# Patient Record
Sex: MALE | Race: WHITE | NOT HISPANIC OR LATINO | Employment: FULL TIME | URBAN - NONMETROPOLITAN AREA
[De-identification: names, ages, dates, MRNs, and addresses within clinical notes are randomized per-mention and may not be internally consistent; named-entity substitution may affect disease eponyms.]

---

## 2023-01-06 ENCOUNTER — OFFICE VISIT (OUTPATIENT)
Dept: URGENT CARE | Facility: CLINIC | Age: 43
End: 2023-01-06

## 2023-01-06 VITALS
DIASTOLIC BLOOD PRESSURE: 104 MMHG | OXYGEN SATURATION: 99 % | SYSTOLIC BLOOD PRESSURE: 170 MMHG | WEIGHT: 252 LBS | HEIGHT: 72 IN | RESPIRATION RATE: 18 BRPM | HEART RATE: 98 BPM | BODY MASS INDEX: 34.13 KG/M2 | TEMPERATURE: 97.3 F

## 2023-01-06 DIAGNOSIS — M54.42 ACUTE LEFT-SIDED LOW BACK PAIN WITH LEFT-SIDED SCIATICA: Primary | ICD-10-CM

## 2023-01-06 RX ORDER — CYCLOBENZAPRINE HCL 5 MG
5 TABLET ORAL 3 TIMES DAILY PRN
Qty: 6 TABLET | Refills: 0 | Status: SHIPPED | OUTPATIENT
Start: 2023-01-06

## 2023-01-06 RX ORDER — PREDNISONE 50 MG/1
50 TABLET ORAL DAILY
Qty: 5 TABLET | Refills: 0 | Status: SHIPPED | OUTPATIENT
Start: 2023-01-06 | End: 2023-01-11

## 2023-01-06 RX ORDER — IBUPROFEN 200 MG
400 TABLET ORAL EVERY 6 HOURS PRN
COMMUNITY

## 2023-01-06 NOTE — PATIENT INSTRUCTIONS
Take steroids as prescribed   Use Flexeril as needed but do not drink alcohol, drive your vehicle, or operate heavy machinery while using this medication  OTC Tylenol for pain  Heat/Ice  OTC Lidoderm patches  Gentle back stretches  Referral placed to Spine Program   Follow up with PCP in 3-5 days  Proceed to  ER if symptoms worsen  Back Pain   AMBULATORY CARE:   Back pain  is common  You may have back pain and muscle spasms  You may feel sore or stiff on one or both sides of your back  The pain may spread to your lower body  Conditions that affect the spine, joints, or muscles can cause back pain  These may include arthritis, spinal stenosis (narrowing of the spinal column), muscle tension, or breakdown of the spinal discs  Call your local emergency number (911 in the [address] [facility]) if:   You have severe back pain with chest pain  You cannot control your urine or bowel movements  Your pain becomes so severe that you cannot walk  Seek care immediately if:   You have pain, numbness, or weakness in one or both legs  You have severe back pain, nausea, and vomiting  You have severe back pain that spreads to your side or genital area  Call your doctor if:   You have back pain that does not get better with rest and pain medicine  You have a fever  You have pain that worsens when you are on your back or when you rest     You have pain that worsens when you cough or sneeze  You lose weight without trying  You have questions or concerns about your condition or care  Medicines: You may need any of the following:  NSAIDs , such as ibuprofen, help decrease swelling, pain, and fever  This medicine is available with or without a doctor's order  NSAIDs can cause stomach bleeding or kidney problems in certain people  If you take blood thinner medicine, always ask your healthcare provider if NSAIDs are safe for you  Always read the medicine label and follow directions      Acetaminophen  decreases pain and fever  It is available without a doctor's order  Ask how much to take and how often to take it  Follow directions  Read the labels of all other medicines you are using to see if they also contain acetaminophen, or ask your doctor or pharmacist  Acetaminophen can cause liver damage if not taken correctly  Do not use more than 4 grams (4,000 milligrams) total of acetaminophen in one day  Muscle relaxers  help decrease muscle spasms and back pain  Take your medicine as directed  Contact your healthcare provider if you think your medicine is not helping or if you have side effects  Tell him or her if you are allergic to any medicine  Keep a list of the medicines, vitamins, and herbs you take  Include the amounts, and when and why you take them  Bring the list or the pill bottles to follow-up visits  Carry your medicine list with you in case of an emergency  Manage your back pain:   Apply ice  on your back for 15 to 20 minutes every hour or as directed  Use an ice pack, or put crushed ice in a plastic bag  Cover it with a towel before you apply it to your skin  Ice helps prevent tissue damage and decreases pain  Apply heat  on your back for 20 to 30 minutes every 2 hours for as many days as directed  Heat helps decrease pain and muscle spasms  Stay active  as much as you can without causing more pain  Bed rest could make your back pain worse  Avoid heavy lifting until your pain is gone  Go to physical therapy as directed  A physical therapist can teach you exercises to help improve movement and strength, and to decrease pain  Follow up with your doctor in 2 weeks, or as directed: You might need to see a specialist  Write down your questions so you remember to ask them during your visits  © Copyright [employer] 2022 Information is for End User's use only and may not be sold, redistributed or otherwise used for commercial purposes   All illustrations and images included in CareNotes® are the copyrighted property of A D A M , Inc  or [facility] Lucina Mazariegos   The above information is an  only  It is not intended as medical advice for individual conditions or treatments  Talk to your doctor, nurse or pharmacist before following any medical regimen to see if it is safe and effective for you

## 2023-01-06 NOTE — PROGRESS NOTES
[facility]        NAME: Emperatriz Suero is a 36 y o  male  : 1980    MRN: [id number]  DATE: 2023  TIME: 11:50 AM    Assessment and Plan   Acute left-sided low back pain with left-sided sciatica [M54 42]  1  Acute left-sided low back pain with left-sided sciatica  predniSONE 50 mg tablet    cyclobenzaprine (FLEXERIL) 5 mg tablet    Ambulatory Referral to [facility]        No red flag finding's on today's PE, neuro intact  Last dose of NSAIDS at 1030 this am and cannot administer Toradol IM in clinic  Patient prescribed flexeril, prednisone, and encouraged to continue with supportive measures  Caution advised with muscle relaxer  Referral placed to [facility] Program  Follow up with PCP in 3-5 days or proceed to ED if symptoms worsen  Patient verbalized understanding of instructions given  Patient Instructions     Patient Instructions   Take steroids as prescribed   Use Flexeril as needed but do not drink alcohol, drive your vehicle, or operate heavy machinery while using this medication  OTC Tylenol for pain  Heat/Ice  OTC Lidoderm patches  Gentle back stretches  Referral placed to Spine Program   Follow up with PCP in 3-5 days  Proceed to  ER if symptoms worsen  Back Pain   AMBULATORY CARE:   Back pain  is common  You may have back pain and muscle spasms  You may feel sore or stiff on one or both sides of your back  The pain may spread to your lower body  Conditions that affect the spine, joints, or muscles can cause back pain  These may include arthritis, spinal stenosis (narrowing of the spinal column), muscle tension, or breakdown of the spinal discs  Call your local emergency number (911 in the [address] [place] [address],[address]) if:   · You have severe back pain with chest pain  · You cannot control your urine or bowel movements  · Your pain becomes so severe that you cannot walk      Seek care immediately if:   · You have pain, numbness, or weakness in one or both legs     · You have severe back pain, nausea, and vomiting  · You have severe back pain that spreads to your side or genital area  Call your doctor if:   · You have back pain that does not get better with rest and pain medicine  · You have a fever  · You have pain that worsens when you are on your back or when you rest     · You have pain that worsens when you cough or sneeze  · You lose weight without trying  · You have questions or concerns about your condition or care  Medicines: You may need any of the following:  · NSAIDs , such as ibuprofen, help decrease swelling, pain, and fever  This medicine is available with or without a doctor's order  NSAIDs can cause stomach bleeding or kidney problems in certain people  If you take blood thinner medicine, always ask your healthcare provider if NSAIDs are safe for you  Always read the medicine label and follow directions  · Acetaminophen  decreases pain and fever  It is available without a doctor's order  Ask how much to take and how often to take it  Follow directions  Read the labels of all other medicines you are using to see if they also contain acetaminophen, or ask your doctor or pharmacist  Acetaminophen can cause liver damage if not taken correctly  Do not use more than 4 grams (4,000 milligrams) total of acetaminophen in one day  · Muscle relaxers  help decrease muscle spasms and back pain  · Take your medicine as directed  Contact your healthcare provider if you think your medicine is not helping or if you have side effects  Tell him or her if you are allergic to any medicine  Keep a list of the medicines, vitamins, and herbs you take  Include the amounts, and when and why you take them  Bring the list or the pill bottles to follow-up visits  Carry your medicine list with you in case of an emergency  Manage your back pain:   · Apply ice  on your back for 15 to 20 minutes every hour or as directed   Use an ice pack, or put crushed ice in a plastic bag  Cover it with a towel before you apply it to your skin  Ice helps prevent tissue damage and decreases pain  · Apply heat  on your back for 20 to 30 minutes every 2 hours for as many days as directed  Heat helps decrease pain and muscle spasms  · Stay active  as much as you can without causing more pain  Bed rest could make your back pain worse  Avoid heavy lifting until your pain is gone  · Go to physical therapy as directed  A physical therapist can teach you exercises to help improve movement and strength, and to decrease pain  Follow up with your doctor in 2 weeks, or as directed: You might need to see a specialist  Write down your questions so you remember to ask them during your visits  © Copyright [employer] 2022 Information is for End User's use only and may not be sold, redistributed or otherwise used for commercial purposes  All illustrations and images included in CareNotes® are the copyrighted property of A D A M , Inc  or [facility] [employer]   The above information is an  only  It is not intended as medical advice for individual conditions or treatments  Talk to your doctor, nurse or pharmacist before following any medical regimen to see if it is safe and effective for you  Chief Complaint     Chief Complaint   Patient presents with   • Leg Pain     C/o pain in left hip which radiates down left leg  Denies trauma, reports does repetitive lifting and also weight lifting  Onset 12 days ago  History of Present Illness       44-year-old male presents with complaints of left-sided lower back pain that radiates down left lower extremity and is accompanied by numbness x12 days  Patient denies any known injury or trauma but does admit to weight lifting  He has been taking OTC Advil without improvement of his symptoms, last dose around 1030 this morning    Patient states a history of chronic low back pain and sciatica, concern for the same  Patient denies any loss of bowel or bladder, weakness, or fever/chills  Leg Pain   Associated symptoms include numbness  Review of Systems   Review of Systems   Constitutional: Negative for chills and fever  Respiratory: Negative for shortness of breath  Cardiovascular: Negative for chest pain  Gastrointestinal: Negative for abdominal pain, diarrhea, nausea and vomiting  Genitourinary: Negative for difficulty urinating  Musculoskeletal: Positive for back pain  Negative for neck pain  Skin: Negative for rash  Neurological: Positive for numbness  Negative for weakness  Current Medications       Current Outpatient Medications:   •  cyclobenzaprine (FLEXERIL) 5 mg tablet, Take 1 tablet (5 mg total) by mouth 3 (three) times a day as needed for muscle spasms, Disp: 6 tablet, Rfl: 0  •  ibuprofen (MOTRIN) 200 mg tablet, Take 400 mg by mouth every 6 (six) hours as needed for mild pain, Disp: , Rfl:   •  predniSONE 50 mg tablet, Take 1 tablet (50 mg total) by mouth daily for 5 days, Disp: 5 tablet, Rfl: 0    Current Allergies     Allergies as of 01/06/2023   • (No Known Allergies)            The following portions of the patient's history were reviewed and updated as appropriate: allergies, current medications, past family history, past medical history, past social history, past surgical history and problem list      History reviewed  No pertinent past medical history  Past Surgical History:   Procedure Laterality Date   • FRACTURE SURGERY         Family History   Problem Relation Age of Onset   • No Known Problems Mother    • Hypertension Father          Medications have been verified  Objective   BP (!) 170/104   Pulse 98   Temp (!) 97 3 °F (36 3 °C)   Resp 18   Ht 6' (1 829 m)   Wt 114 kg (252 lb)   SpO2 99%   BMI 34 18 kg/m²   No LMP for male patient  Physical Exam     Physical Exam  Vitals and nursing note reviewed     Constitutional:       General: He is not in acute distress  Appearance: He is not toxic-appearing  HENT:      Head: Normocephalic  Nose: Nose normal       Mouth/Throat:      Mouth: Mucous membranes are moist       Pharynx: Oropharynx is clear  Eyes:      Conjunctiva/sclera: Conjunctivae normal    Cardiovascular:      Rate and Rhythm: Normal rate and regular rhythm  Heart sounds: Normal heart sounds  Pulmonary:      Effort: Pulmonary effort is normal  No respiratory distress  Breath sounds: Normal breath sounds  No stridor  No wheezing, rhonchi or rales  Abdominal:      General: Bowel sounds are normal       Palpations: Abdomen is soft  Tenderness: There is no abdominal tenderness  Musculoskeletal:         General: Tenderness present  Cervical back: No tenderness  Thoracic back: Normal       Lumbar back: Tenderness and bony tenderness present  Normal range of motion  Positive left straight leg raise test  Negative right straight leg raise test       Comments: Tenderness to palpation over lumbar spinous processes and left sided lumbar paraspinal muscles as well as SI joint tenderness  Skin:     General: Skin is warm and dry  Neurological:      Mental Status: He is alert and oriented to person, place, and time  Sensory: Sensation is intact  No sensory deficit  Motor: Motor function is intact  No weakness  Gait: Gait is intact   Gait normal    Psychiatric:         Mood and Affect: Mood normal          Behavior: Behavior normal

## 2023-01-09 ENCOUNTER — NURSE TRIAGE (OUTPATIENT)
Dept: PHYSICAL THERAPY | Facility: OTHER | Age: 43
End: 2023-01-09

## 2023-01-09 NOTE — TELEPHONE ENCOUNTER
Additional Information  • Negative: Is this related to a work injury? • Negative: Is this related to an MVA? • Negative: Are you currently recieving homecare services? Background - Initial Assessment  Clinical complaint: pain is left low back, left hip and left leg to the foot  Certain spots on left leg and foot are numb  Was seen in [facility] 1/6/2023   NKI Pain started around 12/25/22  Date of onset:   Frequency of pain: intermittent  Quality of pain: burning and numb    Protocols used: [facility] COMPREHENSIVE SPINE PROGRAM PROTOCOL

## 2023-01-09 NOTE — TELEPHONE ENCOUNTER
This RN reached out to patient for completion of triage process  Please see prior notes  Nurse did not receive a CB from the patient as of yet  Intentions were to proceed with his triage for referral to be entered by end of day  Patient did not answer call  Unable to LM as his voice MB is FULL  Will await CB as planned to proceed  Referral closed per protocol

## 2023-01-09 NOTE — TELEPHONE ENCOUNTER
Additional Information  • Negative: Is the patient experiencing blood in sputum? • Negative: Has the patient had unexplained weight loss? • Negative: Does the patient have a fever? • Negative: Is the patient experiencing urine retention? • Negative: Is the patient experiencing acute drop foot or paralysis? • Negative: Has the patient experienced major trauma? (fall from height, high speed collision, direct blow to spine) and is also experiencing nausea, light-headedness, or loss of consciousness? • Negative: Is this a chronic condition? Protocols used: [facility] COMPREHENSIVE SPINE PROGRAM PROTOCOL  Nurse briefly reviewed completion of triage process and entry of referral for PT eval with [facility]  Nurse proceeded with questions  Patient asked nurse if she could call him back "in like 20 minutes"  Patient had mentioned he would like CB in afternoon when speaking with another staff member  RN requested patient call [facility] back when he is able to complete the remaining "Yes or NO type questions"  Patient agreed and thanked nurse  Will await CB to continue with this encounter

## 2023-01-16 DIAGNOSIS — G89.29 OTHER CHRONIC PAIN: ICD-10-CM

## 2023-01-16 DIAGNOSIS — M54.42 LUMBAGO WITH SCIATICA, LEFT SIDE: ICD-10-CM

## 2023-01-20 ENCOUNTER — HOSPITAL ENCOUNTER (OUTPATIENT)
Dept: MRI IMAGING | Facility: HOSPITAL | Age: 43
End: 2023-01-20

## 2023-01-20 DIAGNOSIS — M54.42 LUMBAGO WITH SCIATICA, LEFT SIDE: ICD-10-CM

## 2023-01-20 DIAGNOSIS — G89.29 OTHER CHRONIC PAIN: ICD-10-CM

## 2023-01-27 ENCOUNTER — TELEPHONE (OUTPATIENT)
Dept: PHYSICAL THERAPY | Facility: OTHER | Age: 43
End: 2023-01-27

## 2023-01-27 NOTE — TELEPHONE ENCOUNTER
Message posted on TEAMS for Nursing staff that the patient left a VM on  [facility] MB  Referral to [facility] was entered by provider at [facility] on 1/6/23  DX attached: LBP and Sciatica  Triage started on 1/9/23  PLEASE SEE ALL NOTES FROM 1/9/23 TRIAGE ENCOUNTER  This nurse reached out to patient to discuss VM left today and/or proceed with triage questions  Nurse briefly reviewed prior encounter with him and asked if he wanted to proceed  Patient stated he was seen by [facility] Surgical specialty earlier today and referred to PM   Nurse asked if he called the [facility] office to schedule and he had  He was transferred to [facility]  Patient would like to call office back and schedule appointment as he discussed with Ortho today  Nurse looked up information on line to assist  Patient was given paper referral and S&P office number was provided at his appointment with [facility] this morning  Nurse understood and respected his desire to move forward  Encouraged him to call the office back and discuss the above with clerical staff and also mention he talked with the Nurse at [address]  Patient agreed and thanked nurse  Nurse wished him well  Referral previously closed  No

## 2023-02-03 ENCOUNTER — TELEPHONE (OUTPATIENT)
Dept: OTHER | Facility: OTHER | Age: 43
End: 2023-02-03

## 2023-02-03 NOTE — TELEPHONE ENCOUNTER
Patient is calling regarding cancelling an appointment      Date/Time: 2/3/23 at 7am    Patient was rescheduled: YES [] NO [x]    Patient requesting call back to reschedule: YES [x] NO []    Patient is requesting a later time today for his appointment

## 2023-02-14 RX ORDER — GABAPENTIN 300 MG/1
CAPSULE ORAL
COMMUNITY
Start: 2023-01-27 | End: 2023-02-17

## 2023-02-17 ENCOUNTER — CONSULT (OUTPATIENT)
Dept: PAIN MEDICINE | Facility: CLINIC | Age: 43
End: 2023-02-17

## 2023-02-17 VITALS
SYSTOLIC BLOOD PRESSURE: 154 MMHG | HEART RATE: 96 BPM | HEIGHT: 72 IN | WEIGHT: 265 LBS | RESPIRATION RATE: 20 BRPM | BODY MASS INDEX: 35.89 KG/M2 | DIASTOLIC BLOOD PRESSURE: 88 MMHG | TEMPERATURE: 98 F

## 2023-02-17 DIAGNOSIS — M54.16 LUMBAR RADICULOPATHY: Primary | ICD-10-CM

## 2023-02-17 RX ORDER — 0.9 % SODIUM CHLORIDE 0.9 %
1 VIAL (ML) INJECTION ONCE
OUTPATIENT
Start: 2023-02-17 | End: 2023-02-17

## 2023-02-17 RX ORDER — PREGABALIN 75 MG/1
75 CAPSULE ORAL 3 TIMES DAILY
Qty: 90 CAPSULE | Refills: 0 | Status: SHIPPED | OUTPATIENT
Start: 2023-02-17

## 2023-02-17 RX ORDER — LIDOCAINE HYDROCHLORIDE 10 MG/ML
10 INJECTION, SOLUTION EPIDURAL; INFILTRATION; INTRACAUDAL; PERINEURAL ONCE
OUTPATIENT
Start: 2023-02-17 | End: 2023-02-17

## 2023-02-17 NOTE — PATIENT INSTRUCTIONS
Core Strengthening Exercises   WHAT YOU NEED TO KNOW:   Your core includes the muscles of your lower back, hip, pelvis, and abdomen  Core strengthening exercises help heal and strengthen these muscles  This helps prevent another injury, and keeps your pelvis, spine, and hips in the correct position  DISCHARGE INSTRUCTIONS:   Call your doctor or physical therapist if:   You have sharp or worsening pain during exercise or at rest     You have questions or concerns about your shoulder exercises  Safety tips:  Talk to your healthcare provider before you start an exercise program  A physical therapist can teach you how to do core strengthening exercises safely  Do the exercises on a mat or firm surface  A firm surface will support your spine and prevent low back pain  Do not do these exercises on a bed  Move slowly and smoothly  Avoid fast or jerky motions  Stop if you feel pain  You may feel some discomfort at first, but you should not feel pain  Tell your provider or physical therapist if you have pain while you exercise  Regular exercise will help decrease your discomfort over time  Breathe normally during core exercises  Do not hold your breath  This may cause an increase in blood pressure and prevent muscle strengthening  Your healthcare provider will tell you when to inhale and exhale during the exercise  Begin all of your exercises with abdominal bracing  Abdominal bracing helps warm up your core muscles  You can also practice abdominal bracing throughout the day  Lie on your back with your knees bent and feet flat on the floor  Place your arms in a relaxed position beside your body  Tighten your abdominal muscles  Pull your belly button in and up toward your spine  Hold for 5 seconds  Relax your muscles  Repeat 10 times  Core strengthening exercises: Your healthcare provider will tell you how often to do these exercises   The provider will also tell you how many repetitions of each exercise you should do  Hold each exercise for 5 seconds or as directed  As you get stronger, increase your hold to 10 to 15 seconds  You can do some of these exercises on a stability ball, or with a weight  Ask your healthcare provider how to use a stability ball or weight for these exercises:  Bridging:  Lie on your back with your knees bent and feet flat on the floor  Rest your arms at your side  Tighten your buttocks, and then lift your hips 1 inch off the floor  Hold for 5 seconds  When you can do this exercise without pain for 10 seconds, increase the distance you lift your hips  A good goal is to be able to lift your hips so that your shoulders, hips, and knees are in a straight line  Dead bug:  Lie on your back with your knees bent and feet flat on the floor  Place your arms in a relaxed position beside your body  Begin with abdominal bracing  Next, raise one leg, keeping your knee bent  Hold for 5 seconds  Repeat with the other leg  When you can do this exercise without pain for 10 to 15 seconds, you may raise one straight leg and hold  Repeat with the other leg  Quadruped:  Place your hands and knees on the floor  Keep your wrists directly below your shoulders and your knees directly below your hips  Pull your belly button in toward your spine  Do not flatten or arch your back  Tighten your abdominal muscles below your belly button  Hold for 5 seconds  When you can do this exercise without pain for 10 to 15 seconds, you may extend one arm and hold  Repeat on the other side  Side bridge exercises:      Standing side bridge:  Stand next to a wall and extend one arm toward the wall  Place your palm flat on the wall with your fingers pointing upward  Begin with abdominal bracing  Next, without moving your feet, slowly bend your arm to 90 degrees  Hold for 5 seconds  Repeat on the other side   When you can do this exercise without pain for 10 to 15 seconds, you may do the bent leg side bridge on the floor  Bent leg side bridge:  Lie on one side with your legs, hips, and shoulders in a straight line  Prop yourself up onto your forearm so your elbow is directly below your shoulder  Bend your knees back to 90 degrees  Begin with abdominal bracing  Next, lift your hips and balance yourself on your forearm and knees  Hold for 5 seconds  Repeat on the other side  When you can do this exercise without pain for 10 to 15 seconds, you may do the straight leg side bridge on the floor  Straight leg side bridge:  Lie on one side with your legs, hips, and shoulders in a straight line  Prop yourself up onto your forearm so your elbow is directly below your shoulder  Begin with abdominal bracing  Lift your hips off the floor and balance yourself on your forearm and the outside of your flexed foot  Do not let your ankle bend sideways  Hold for 5 seconds  Repeat on the other side  When you can do this exercise without pain for 10 to 15 seconds, ask your healthcare provider for more advanced exercises  Superman:  Lie on your stomach  Extend your arms forward on the floor  Tighten your abdominal muscles and lift your right hand and left leg off the floor  Hold this position  Slowly return to the starting position  Tighten your abdominal muscles and lift your left hand and right leg off the floor  Hold this position  Slowly return to the starting position  Clam:  Lie on your side with your knees bent  Put your bottom arm under your head to keep your neck in line  Put your top hand on your hip to keep your pelvis from moving  Put your heels together, and keep them together during this exercise  Slowly raise your top knee toward the ceiling  Then lower your leg so your knees are together  Repeat this exercise 10 times  Then switch sides and do the exercise 10 times with the other leg  Curl up:  Lie on your back with your knees bent and feet flat on the floor   Place your hands, palms down, underneath your lower back  Next, with your elbows on the floor, lift your shoulders and chest 2 to 3 inches off the floor  Keep your head in line with your shoulders  Hold this position  Slowly return to the starting position  Straight leg raises:  Lie on your back with one leg straight  Bend the other knee and place your foot flat on the floor  Tighten your abdominal muscles  Keep your leg straight and slowly lift it straight up 6 to 12 inches off the floor  Hold this position  Lower your leg slowly  Do as many repetitions as directed on this side  Repeat with the other leg  Plank:  Lie on your stomach  Bend your elbows and place your forearms flat on the floor  Lift your chest, stomach, and knees off the floor  Make sure your elbows are below your shoulders  Your body should be in a straight line  Do not let your hips or lower back sink to the ground  Squeeze your abdominal muscles together and hold for 15 seconds  To make this exercise harder, hold for 30 seconds or lift 1 leg at a time  Bicycles:  Lie on your back  Bend both knees and bring them toward your chest  Your calves should be parallel to the floor  Place the palms of your hands on the back of your head  Straighten your right leg and keep it lifted 2 inches off the floor  Raise your head and shoulders off the floor and twist towards your left  Keep your head and shoulders lifted  Bend your right knee while you straighten your left leg  Keep your left leg 2 inches off the floor  Twist your head and chest towards the left leg  Continue to straighten 1 leg at a time and twist        Follow up with your doctor or physical therapist as directed:  Write down your questions so you remember to ask them during your visits  © Copyright [employer] 2022 Information is for End User's use only and may not be sold, redistributed or otherwise used for commercial purposes  The above information is an  only   It is not intended as medical advice for individual conditions or treatments  Talk to your doctor, nurse or pharmacist before following any medical regimen to see if it is safe and effective for you

## 2023-02-17 NOTE — LETTER
February 17, 2023       No Recipients    Patient: George Connors   YOB: 1980   Date of Visit: 2/17/2023       Dear Dr Portillo Getting Recipients: Thank you for referring George Connors to me for evaluation  Below are my notes for this consultation  If you have questions, please do not hesitate to call me  I look forward to following your patient along with you  Sincerely,        Fredy Dorman MD        CC:   No Recipients  Fredy Dorman MD  2/17/2023  9:39 AM  Signed      Assessment  1  Lumbar radiculopathy - Left  -     pregabalin (LYRICA) 75 mg capsule; Take 1 capsule (75 mg total) by mouth 3 (three) times a day  -     Case request operating room: BLOCK / INJECTION EPIDURAL STEROID LUMBAR Left L5 and S1 TFESI; Standing  -     Case request operating room: BLOCK / INJECTION EPIDURAL STEROID LUMBAR Left L5 and S1 TFESI    Left-sided lumbar radicular pain in the L4 and L5 dermatomal distribution accompanied by pain limited weakness numbness and paresthesias  Patient has initiated participation with PT  Chronic pain with decreased participation with IADLs over the past 3 months  Has been taking OTC ibuprofen and tylenol in addition to gabapentin and bsauds with modest benefit  5/5 strength bilaterally, positive SLR left-sided  Reflexes 2+  Additionally there is positive facet loading, left greater than right  Denies any gait instability, saddle anesthesia  On MRI imaging at L5-S1 there is prominent central disc extrusion extending into right subarticular and right foraminal zones with inferior migration compressing cauda equina nerve roots and resulting in severe canal stenosis  At L4-L5 there is small central disc extrusion with inferior migration contacts left L5 descending nerve root and results in moderate canal stenosis     Risks, benefits alternatives epidural steroid injections thoroughly discussed with patient  Handouts provided questions answered to patient's satisfaction    Lifestyle modifications extensively discussed including diet, exercise and weight loss in addition to core strengthening  Will proceed with multimodal pain therapy plan as noted below:    Plan  -Left L4 and L5 TFESI; f/u 2 weeks post procedure  -gabapentin transitioned to lyrica 75 mg t i d  Ordered for patient; counseled regarding sedative effects of taking this medication and provided up titration calendar  Counseled not to take medication while driving or operating heavy machinery/using stairs  -Diclofenac 75 mg b i d  prn pain previously prescribed  Patient educated regarding bleeding risk of taking this medication as well as not taking any other nonsteroidal anti-inflammatory medications while taking this medication; counseled thoroughly regarding potential risk of Cardiovascular injury, Kidney injury, Gastrointestinal ulceration/bleeding  Patient voiced understanding  -script provided for formal physical therapy for left-sided lumbar radiculopathy; Physician directed home exercise plan as per [facility] demonstrated and handouts provided that patient plans to participate with for 1 hour, twice a week for the next 6 weeks  There are risks associated with opioid medications, including dependence, addiction and tolerance  The patient understands and agrees to use these medications only as prescribed  Potential side effects of the medications include, but are not limited to, constipation, drowsiness, addiction, impaired judgment and risk of fatal overdose if not taken as prescribed  The patient was warned against driving while taking sedation medications or operating heavy machinery  The patient voiced understanding  Sharing medications is a felony  At this point in time, the patient is showing no signs of addiction, abuse, diversion or suicidal ideation       [place] Prescription Drug Monitoring Program report was reviewed and was appropriate      Complete risks and benefits including bleeding, infection, tissue reaction, nerve injury and allergic reaction were discussed  The approach was demonstrated using models and literature was provided  Verbal and written consent was obtained  My impressions and treatment recommendations were discussed in detail with the patient who verbalized understanding and had no further questions  Discharge instructions were provided  I personally saw and examined the patient and I agree with the above discussed plan of care  New Medications Ordered This Visit   Medications   • gabapentin (NEURONTIN) 300 mg capsule     Sig: TAKE 1 CAPSULE BY MOUTH EVERY DAY IN THE MORNING , AT NOON, AND BEFORE BEDTIME       History of Present Illness    Haris Huynh is a 36 y o  male with pmhx of obesity presenting with chronic lumbar radicular pain in the left L4 and L5 dermatomal distributions  Debilitating pain limited weakness numbness and paresthesias accompany the pain  The patient rates the pain at a 8/10 accompanied by electric shock-like shooting features and crampy burning pain in the aforementioned dermatomal distributions  The pain is worse in the mornings as well as the end of the day; exertion such as walking for long periods of time seems to exacerbate the pain  He tries to maintain an active lifestyle and finds that the current degree of pain seems to compromises his efforts  The pain significantly impacts independent activities of daily living and contributes to significant disability  He has initiated physical therapy  He has taken nsaids as well as gabapentin with limited relief of the pain as well  He has never tried epidural steroid injections in the past  He denies any bowel or bladder dysfunction/incontinence, saddle anesthesia or gait instability  I have personally reviewed and/or updated the patient's past medical history, past surgical history, family history, social history, current medications, allergies, and vital signs today       Review of Systems   Constitutional: Positive for activity change  HENT: Negative  Eyes: Negative  Respiratory: Negative  Cardiovascular: Negative  Gastrointestinal: Negative  Endocrine: Negative  Genitourinary: Negative  Musculoskeletal: Positive for arthralgias, back pain, gait problem and myalgias  Skin: Negative  Allergic/Immunologic: Negative  Neurological: Positive for weakness and numbness  Hematological: Negative  Psychiatric/Behavioral: Negative  All other systems reviewed and are negative  There is no problem list on file for this patient  History reviewed  No pertinent past medical history      Past Surgical History:   Procedure Laterality Date   • FRACTURE SURGERY         Family History   Problem Relation Age of Onset   • No Known Problems Mother    • Hypertension Father        Social History     Occupational History   • Not on file   Tobacco Use   • Smoking status: Never   • Smokeless tobacco: Never   Vaping Use   • Vaping Use: Never used   Substance and Sexual Activity   • Alcohol use: Yes     Comment: rare   • Drug use: Never   • Sexual activity: Not on file       Current Outpatient Medications on File Prior to Visit   Medication Sig   • diclofenac (VOLTAREN) 75 mg EC tablet Take 75 mg by mouth 2 (two) times a day   • gabapentin (NEURONTIN) 300 mg capsule TAKE 1 CAPSULE BY MOUTH EVERY DAY IN THE MORNING , AT NOON, AND BEFORE BEDTIME   • ibuprofen (MOTRIN) 200 mg tablet Take 400 mg by mouth every 6 (six) hours as needed for mild pain   • tiZANidine (ZANAFLEX) 4 mg tablet Take 1 tablet by mouth every 8 (eight) hours as needed   • cyclobenzaprine (FLEXERIL) 5 mg tablet Take 1 tablet (5 mg total) by mouth 3 (three) times a day as needed for muscle spasms (Patient not taking: Reported on 2/17/2023)   • gabapentin (NEURONTIN) 100 mg capsule TAKE 1 CAP TWICE DAILY FOR 1 WEEK THAN INCREASE TO THREE TIMES DAILY (Patient not taking: Reported on 2/17/2023)   • predniSONE 20 mg tablet TAKE 2 TABLETS BY MOUTH IN THE MORNING FOR 5 DAYS  (Patient not taking: Reported on 2/17/2023)     No current facility-administered medications on file prior to visit  No Known Allergies      Physical Exam    /88   Pulse 96   Temp 98 °F (36 7 °C)   Resp 20   Ht 6' (1 829 m)   Wt 120 kg (265 lb)   BMI 35 94 kg/m²     Constitutional: normal, well developed, well nourished, alert, in no distress and non-toxic and no overt pain behavior  and obese  Eyes: anicteric  HEENT: grossly intact  Neck: supple, symmetric, trachea midline and no masses   Pulmonary:even and unlabored  Cardiovascular:No edema or pitting edema present  Skin:Normal without rashes or lesions and well hydrated  Psychiatric:Mood and affect appropriate   Neurologic:Cranial Nerves II-XII grossly intact Sensation grossly intact; no clonus negative duran's  Reflexes 2+ and brisk  SLR positive left sided  Musculoskeletal:normal gait  5/5 strength bilaterally with AROM in all extremities  Normal heel toe and tip toe walking  Pain with lumbar facet loading bilaterally and with lateral spine rotation  ttp over lumbar paraspinal muscles  Negative connor's test, negative gaenslen's negative SIJ loading bilaterally  Imaging    MRI LUMBAR SPINE WITHOUT CONTRAST     INDICATION: M54 [id number]: Lumbago with sciatica, left side  G89 29: Other chronic pain  Low back pain; Neurologic deficit, non-traumatic; LE weakness; No increasing/persistent or sudden onset LE weakness     COMPARISON:  Outside lumbar spine radiograph January 13, 2023      TECHNIQUE:  Multiplanar, multisequence imaging of the lumbar spine was performed             IMAGE QUALITY:  Diagnostic     FINDINGS:     VERTEBRAL BODIES:  There are 5 lumbar type vertebral bodies  Normal alignment of the lumbar spine  No spondylolysis or spondylolisthesis  No scoliosis  No compression fracture          Small L5 intraosseous vertebral body hemangioma    Otherwise, normal bone marrow signal      SACRUM:  Normal signal within the sacrum  No evidence of insufficiency or stress fracture      DISTAL CORD AND CONUS:  Normal size and signal within the distal cord and conus      PARASPINAL SOFT TISSUES:  Paraspinal soft tissues are unremarkable      LOWER THORACIC DISC SPACES:  Normal disc height and signal   No disc herniation, canal stenosis or foraminal narrowing      LUMBAR DISC SPACES:  Mild disc height loss at L4-L5 and L5-S1      L1-L2:  Normal      L2-L3:  Normal      L3-L4:  Normal      L4-L5:  Diffuse disc bulge, small posterior annular fissure, small central disc extrusion with inferior migration contacts left L5 descending nerve root  Moderate canal stenosis  Mild bilateral foraminal narrowing      L5-S1:  Diffuse disc bulge with prominent central disc extrusion extending into right subarticular and right foraminal zones with inferior migration compresses cauda equina nerve roots  Severe canal stenosis  Moderate bilateral foraminal narrowing      OTHER FINDINGS:  None      IMPRESSION:     L5-S1: Prominent central disc extrusion extending into right subarticular and right foraminal zones with inferior migration compressing cauda equina nerve roots and resulting in severe canal stenosis    Findings can be seen in cauda equina syndrome in the   appropriate clinical setting      L4-L5: Small central disc extrusion with inferior migration contacts left L5 descending nerve root and results in moderate canal stenosis      Multilevel degenerative changes of lumbar spine with varying degrees of canal stenosis (severe L5-S1) and foraminal narrowing (moderate bilateral L5-S1), as detailed above      The study was marked in EPIC for immediate notification

## 2023-03-09 ENCOUNTER — TELEPHONE (OUTPATIENT)
Dept: PAIN MEDICINE | Facility: CLINIC | Age: 43
End: 2023-03-09

## 2024-09-16 NOTE — PROGRESS NOTES
Assessment  1  Lumbar radiculopathy - Left  -     pregabalin (LYRICA) 75 mg capsule; Take 1 capsule (75 mg total) by mouth 3 (three) times a day  -     Case request operating room: BLOCK / INJECTION EPIDURAL STEROID LUMBAR Left L5 and S1 TFESI; Standing  -     Case request operating room: BLOCK / INJECTION EPIDURAL STEROID LUMBAR Left L5 and S1 TFESI    Left-sided lumbar radicular pain in the L4 and L5 dermatomal distribution accompanied by pain limited weakness numbness and paresthesias  Patient has initiated participation with PT  Chronic pain with decreased participation with IADLs over the past 3 months  Has been taking OTC ibuprofen and tylenol in addition to gabapentin and bsauds with modest benefit  5/5 strength bilaterally, positive SLR left-sided  Reflexes 2+  Additionally there is positive facet loading, left greater than right  Denies any gait instability, saddle anesthesia  On MRI imaging at L5-S1 there is prominent central disc extrusion extending into right subarticular and right foraminal zones with inferior migration compressing cauda equina nerve roots and resulting in severe canal stenosis  At L4-L5 there is small central disc extrusion with inferior migration contacts left L5 descending nerve root and results in moderate canal stenosis     Risks, benefits alternatives epidural steroid injections thoroughly discussed with patient  Handouts provided questions answered to patient's satisfaction  Lifestyle modifications extensively discussed including diet, exercise and weight loss in addition to core strengthening  Will proceed with multimodal pain therapy plan as noted below:    Plan  -Left L4 and L5 TFESI; f/u 2 weeks post procedure  -gabapentin transitioned to lyrica 75 mg t i d  Ordered for patient; counseled regarding sedative effects of taking this medication and provided up titration calendar    Counseled not to take medication while driving or operating heavy machinery/using stairs  -Diclofenac 75 mg b i d  prn pain previously prescribed  Patient educated regarding bleeding risk of taking this medication as well as not taking any other nonsteroidal anti-inflammatory medications while taking this medication; counseled thoroughly regarding potential risk of Cardiovascular injury, Kidney injury, Gastrointestinal ulceration/bleeding  Patient voiced understanding  -script provided for formal physical therapy for left-sided lumbar radiculopathy; Physician directed home exercise plan as per [facility] demonstrated and handouts provided that patient plans to participate with for 1 hour, twice a week for the next 6 weeks  There are risks associated with opioid medications, including dependence, addiction and tolerance  The patient understands and agrees to use these medications only as prescribed  Potential side effects of the medications include, but are not limited to, constipation, drowsiness, addiction, impaired judgment and risk of fatal overdose if not taken as prescribed  The patient was warned against driving while taking sedation medications or operating heavy machinery  The patient voiced understanding  Sharing medications is a felony  At this point in time, the patient is showing no signs of addiction, abuse, diversion or suicidal ideation  [place] Prescription Drug Monitoring Program report was reviewed and was appropriate      Complete risks and benefits including bleeding, infection, tissue reaction, nerve injury and allergic reaction were discussed  The approach was demonstrated using models and literature was provided  Verbal and written consent was obtained  My impressions and treatment recommendations were discussed in detail with the patient who verbalized understanding and had no further questions  Discharge instructions were provided  I personally saw and examined the patient and I agree with the above discussed plan of care      New Medications Ordered This Visit   Medications   • gabapentin (NEURONTIN) 300 mg capsule     Sig: TAKE 1 CAPSULE BY MOUTH EVERY DAY IN THE MORNING , AT NOON, AND BEFORE BEDTIME       History of Present Illness    Benson Martino is a 36 y o  male with pmhx of obesity presenting with chronic lumbar radicular pain in the left L4 and L5 dermatomal distributions  Debilitating pain limited weakness numbness and paresthesias accompany the pain  The patient rates the pain at a 8/10 accompanied by electric shock-like shooting features and crampy burning pain in the aforementioned dermatomal distributions  The pain is worse in the mornings as well as the end of the day; exertion such as walking for long periods of time seems to exacerbate the pain  He tries to maintain an active lifestyle and finds that the current degree of pain seems to compromises his efforts  The pain significantly impacts independent activities of daily living and contributes to significant disability  He has initiated physical therapy  He has taken nsaids as well as gabapentin with limited relief of the pain as well  He has never tried epidural steroid injections in the past  He denies any bowel or bladder dysfunction/incontinence, saddle anesthesia or gait instability  I have personally reviewed and/or updated the patient's past medical history, past surgical history, family history, social history, current medications, allergies, and vital signs today  Review of Systems   Constitutional: Positive for activity change  HENT: Negative  Eyes: Negative  Respiratory: Negative  Cardiovascular: Negative  Gastrointestinal: Negative  Endocrine: Negative  Genitourinary: Negative  Musculoskeletal: Positive for arthralgias, back pain, gait problem and myalgias  Skin: Negative  Allergic/Immunologic: Negative  Neurological: Positive for weakness and numbness  Hematological: Negative  Psychiatric/Behavioral: Negative      All other systems reviewed and are negative  There is no problem list on file for this patient  History reviewed  No pertinent past medical history  Past Surgical History:   Procedure Laterality Date   • FRACTURE SURGERY         Family History   Problem Relation Age of Onset   • No Known Problems Mother    • Hypertension Father        Social History     Occupational History   • Not on file   Tobacco Use   • Smoking status: Never   • Smokeless tobacco: Never   Vaping Use   • Vaping Use: Never used   Substance and Sexual Activity   • Alcohol use: Yes     Comment: rare   • Drug use: Never   • Sexual activity: Not on file       Current Outpatient Medications on File Prior to Visit   Medication Sig   • diclofenac (VOLTAREN) 75 mg EC tablet Take 75 mg by mouth 2 (two) times a day   • gabapentin (NEURONTIN) 300 mg capsule TAKE 1 CAPSULE BY MOUTH EVERY DAY IN THE MORNING , AT NOON, AND BEFORE BEDTIME   • ibuprofen (MOTRIN) 200 mg tablet Take 400 mg by mouth every 6 (six) hours as needed for mild pain   • tiZANidine (ZANAFLEX) 4 mg tablet Take 1 tablet by mouth every 8 (eight) hours as needed   • cyclobenzaprine (FLEXERIL) 5 mg tablet Take 1 tablet (5 mg total) by mouth 3 (three) times a day as needed for muscle spasms (Patient not taking: Reported on 2/17/2023)   • gabapentin (NEURONTIN) 100 mg capsule TAKE 1 CAP TWICE DAILY FOR 1 WEEK THAN INCREASE TO THREE TIMES DAILY (Patient not taking: Reported on 2/17/2023)   • predniSONE 20 mg tablet TAKE 2 TABLETS BY MOUTH IN THE MORNING FOR 5 DAYS  (Patient not taking: Reported on 2/17/2023)     No current facility-administered medications on file prior to visit  No Known Allergies      Physical Exam    /88   Pulse 96   Temp 98 °F (36 7 °C)   Resp 20   Ht 6' (1 829 m)   Wt 120 kg (265 lb)   BMI 35 94 kg/m²     Constitutional: normal, well developed, well nourished, alert, in no distress and non-toxic and no overt pain behavior   and obese  Eyes: anicteric  HEENT: grossly intact  Neck: supple, symmetric, trachea midline and no masses   Pulmonary:even and unlabored  Cardiovascular:No edema or pitting edema present  Skin:Normal without rashes or lesions and well hydrated  Psychiatric:Mood and affect appropriate   Neurologic:Cranial Nerves II-XII grossly intact Sensation grossly intact; no clonus negative duran's  Reflexes 2+ and brisk  SLR positive left sided  Musculoskeletal:normal gait  5/5 strength bilaterally with AROM in all extremities  Normal heel toe and tip toe walking  Pain with lumbar facet loading bilaterally and with lateral spine rotation  ttp over lumbar paraspinal muscles  Negative connor's test, negative gaenslen's negative SIJ loading bilaterally  Imaging    MRI LUMBAR SPINE WITHOUT CONTRAST     INDICATION: [id number]: Lumbago with sciatica, left side  G89 29: Other chronic pain  Low back pain; Neurologic deficit, non-traumatic; LE weakness; No increasing/persistent or sudden onset LE weakness     COMPARISON:  Outside lumbar spine radiograph January 13, 2023      TECHNIQUE:  Multiplanar, multisequence imaging of the lumbar spine was performed             IMAGE QUALITY:  Diagnostic     FINDINGS:     VERTEBRAL BODIES:  There are 5 lumbar type vertebral bodies  Normal alignment of the lumbar spine  No spondylolysis or spondylolisthesis  No scoliosis  No compression fracture          Small L5 intraosseous vertebral body hemangioma  Otherwise, normal bone marrow signal      SACRUM:  Normal signal within the sacrum   No evidence of insufficiency or stress fracture      DISTAL CORD AND CONUS:  Normal size and signal within the distal cord and conus      PARASPINAL SOFT TISSUES:  Paraspinal soft tissues are unremarkable      LOWER THORACIC DISC SPACES:  Normal disc height and signal   No disc herniation, canal stenosis or foraminal narrowing      LUMBAR DISC SPACES:  Mild disc height loss at L4-L5 and L5-S1      L1-L2:  Normal      L2-L3:  Normal      L3-L4: Normal      L4-L5:  Diffuse disc bulge, small posterior annular fissure, small central disc extrusion with inferior migration contacts left L5 descending nerve root  Moderate canal stenosis  Mild bilateral foraminal narrowing      L5-S1:  Diffuse disc bulge with prominent central disc extrusion extending into right subarticular and right foraminal zones with inferior migration compresses cauda equina nerve roots  Severe canal stenosis  Moderate bilateral foraminal narrowing      OTHER FINDINGS:  None      IMPRESSION:     L5-S1: Prominent central disc extrusion extending into right subarticular and right foraminal zones with inferior migration compressing cauda equina nerve roots and resulting in severe canal stenosis    Findings can be seen in cauda equina syndrome in the   appropriate clinical setting      L4-L5: Small central disc extrusion with inferior migration contacts left L5 descending nerve root and results in moderate canal stenosis      Multilevel degenerative changes of lumbar spine with varying degrees of canal stenosis (severe L5-S1) and foraminal narrowing (moderate bilateral L5-S1), as detailed above      The study was marked in EPIC for immediate notification     none